# Patient Record
(demographics unavailable — no encounter records)

---

## 2024-10-07 NOTE — ADDENDUM
[FreeTextEntry1] : This note was written by Ramy Quiñonez on 10/07/2024 acting as scribe for Dr. Chay Matson M.D.  I, Dr. Chay Matson, have read and attest that all the information, medical decision making and discharge instructions within are true and accurate.

## 2024-10-07 NOTE — HISTORY OF PRESENT ILLNESS
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 Patient was doing well up until 5 months ago with left knee pain and unsteadiness. She has this sensation of the knee not feeling secure. She takes occasional Ibuprofen PM to help her sleep. She is continuing with Pilates. She does not do much exercises. Patient does report of mild right knee pain, but she concerned about new onset pain. She denies swelling, stiffness, loss of motion, fevers, chills, and night sweats.

## 2024-10-07 NOTE — REVIEW OF SYSTEMS
[Joint Pain] : joint pain [Negative] : Heme/Lymph [Arthralgia] : no arthralgia [Joint Stiffness] : no joint stiffness [Joint Swelling] : no joint swelling Alert and oriented to person, place and time

## 2024-10-07 NOTE — PHYSICAL EXAM
[de-identified] : RIGHT Knee Inspection: no effusion or erythema. Wounds: healed midline incision. Alignment: normal. Palpation: no specific tenderness on palpation. ROM: Active (in degrees): 0-135. Ligamentous laxity (neg): all ligaments appear stable, negative ant. drawer test, negative post. drawer test, stable to varus stress test, stable to valgus stress test, negative Lachman's test, negative pivot shift test. Meniscal test: negative Vladislav's, negative Tammy. Patellofemoral Alignment Test: Q-angle-, normal. Muscle Test: good quad strength. Leg examination: calf is soft and non-tender.   LEFT Knee Inspection: no effusion or erythema. Wounds: healed midline incision. Alignment: normal. Palpation: no specific tenderness on palpation. ROM: Active (in degrees): 0-135. Ligamentous laxity (neg): all ligaments appear stable, negative ant. drawer test, negative post. drawer test, stable to varus stress test, stable to valgus stress test, negative Lachman's test, negative pivot shift test. Meniscal test: negative Vladislav's, negative Tammy. Patellofemoral Alignment Test: Q-angle-, normal. Muscle Test: good quad strength. Leg examination: calf is soft and non-tender. [de-identified] : RIGHT knee x-ray, AP, lateral, merchant view taken at the office today demonstrates a total knee replacement in satisfactory position and alignment. No evidence of loosening. The patella sits in a central position.   LEFT knee x-ray, AP, lateral, merchant view taken at the office today demonstrates a total knee replacement in satisfactory position and alignment. No evidence of loosening. The patella sits in a central position.

## 2024-10-07 NOTE — DISCUSSION/SUMMARY
[de-identified] : Pt is doing very well following her bilateral TKRs. I reviewed x-rays with them and no interval changes compared to prior films.  I have reassured them that their implants are functioning well. Her left knee unsteadiness appears to be muscular in nature. At this time, I recommend a course of physical therapy to work on strengthening the knee, since she has good ROM.   She is encouraged to continue to stay active, a home exercise program.  She can continue activities as tolerated. All questions answered, understanding verbalized. Patient in agreement with plan of care.   Patient may follow up with x-rays in 1 year.   KOOS scores were attained.